# Patient Record
Sex: MALE | Race: BLACK OR AFRICAN AMERICAN | URBAN - METROPOLITAN AREA
[De-identification: names, ages, dates, MRNs, and addresses within clinical notes are randomized per-mention and may not be internally consistent; named-entity substitution may affect disease eponyms.]

---

## 2022-06-15 ENCOUNTER — OFFICE VISIT (OUTPATIENT)
Dept: URGENT CARE | Facility: CLINIC | Age: 42
End: 2022-06-15
Payer: COMMERCIAL

## 2022-06-15 VITALS
DIASTOLIC BLOOD PRESSURE: 94 MMHG | OXYGEN SATURATION: 96 % | HEIGHT: 71 IN | RESPIRATION RATE: 20 BRPM | WEIGHT: 250 LBS | HEART RATE: 75 BPM | BODY MASS INDEX: 35 KG/M2 | TEMPERATURE: 99 F | SYSTOLIC BLOOD PRESSURE: 150 MMHG

## 2022-06-15 DIAGNOSIS — L03.019 PARONYCHIA OF MIDDLE FINGER: Primary | ICD-10-CM

## 2022-06-15 PROCEDURE — 1159F PR MEDICATION LIST DOCUMENTED IN MEDICAL RECORD: ICD-10-PCS | Mod: CPTII,S$GLB,, | Performed by: NURSE PRACTITIONER

## 2022-06-15 PROCEDURE — 1160F RVW MEDS BY RX/DR IN RCRD: CPT | Mod: CPTII,S$GLB,, | Performed by: NURSE PRACTITIONER

## 2022-06-15 PROCEDURE — 3080F PR MOST RECENT DIASTOLIC BLOOD PRESSURE >= 90 MM HG: ICD-10-PCS | Mod: CPTII,S$GLB,, | Performed by: NURSE PRACTITIONER

## 2022-06-15 PROCEDURE — 3080F DIAST BP >= 90 MM HG: CPT | Mod: CPTII,S$GLB,, | Performed by: NURSE PRACTITIONER

## 2022-06-15 PROCEDURE — 10060 INCISION & DRAINAGE: ICD-10-PCS | Mod: S$GLB,,, | Performed by: NURSE PRACTITIONER

## 2022-06-15 PROCEDURE — 3008F BODY MASS INDEX DOCD: CPT | Mod: CPTII,S$GLB,, | Performed by: NURSE PRACTITIONER

## 2022-06-15 PROCEDURE — 3077F PR MOST RECENT SYSTOLIC BLOOD PRESSURE >= 140 MM HG: ICD-10-PCS | Mod: CPTII,S$GLB,, | Performed by: NURSE PRACTITIONER

## 2022-06-15 PROCEDURE — 3008F PR BODY MASS INDEX (BMI) DOCUMENTED: ICD-10-PCS | Mod: CPTII,S$GLB,, | Performed by: NURSE PRACTITIONER

## 2022-06-15 PROCEDURE — 1160F PR REVIEW ALL MEDS BY PRESCRIBER/CLIN PHARMACIST DOCUMENTED: ICD-10-PCS | Mod: CPTII,S$GLB,, | Performed by: NURSE PRACTITIONER

## 2022-06-15 PROCEDURE — 99203 OFFICE O/P NEW LOW 30 MIN: CPT | Mod: 25,S$GLB,, | Performed by: NURSE PRACTITIONER

## 2022-06-15 PROCEDURE — 10060 I&D ABSCESS SIMPLE/SINGLE: CPT | Mod: S$GLB,,, | Performed by: NURSE PRACTITIONER

## 2022-06-15 PROCEDURE — 99203 PR OFFICE/OUTPT VISIT, NEW, LEVL III, 30-44 MIN: ICD-10-PCS | Mod: 25,S$GLB,, | Performed by: NURSE PRACTITIONER

## 2022-06-15 PROCEDURE — 87070 CULTURE OTHR SPECIMN AEROBIC: CPT | Performed by: NURSE PRACTITIONER

## 2022-06-15 PROCEDURE — 1159F MED LIST DOCD IN RCRD: CPT | Mod: CPTII,S$GLB,, | Performed by: NURSE PRACTITIONER

## 2022-06-15 PROCEDURE — 3077F SYST BP >= 140 MM HG: CPT | Mod: CPTII,S$GLB,, | Performed by: NURSE PRACTITIONER

## 2022-06-15 RX ORDER — CIPROFLOXACIN 500 MG/1
500 TABLET ORAL 2 TIMES DAILY
Qty: 14 TABLET | Refills: 0 | Status: SHIPPED | OUTPATIENT
Start: 2022-06-15 | End: 2022-06-22

## 2022-06-15 NOTE — PROCEDURES
Incision & Drainage    Date/Time: 6/15/2022 10:15 AM  Performed by: Lauryn Vergara NP  Authorized by: Lauryn Vergara NP       Type:  Abscess (2 cm in length)  Body area:  Upper extremity  Location details:  Right long finger  Anesthesia:  See MAR for details  Scalpel size: needle.  Complexity:  Simple  Drainage:  Pus (malodorous)  Drainage amount:  Copious  Wound treatment:  Drainage  Packing material:  None  Patient tolerance:  Patient tolerated the procedure well with no immediate complications    Pressure dressing applied.

## 2022-06-15 NOTE — PROGRESS NOTES
"Subjective:       Patient ID: Kali Castillo is a 42 y.o. male.    Vitals:  height is 5' 11" (1.803 m) and weight is 113.4 kg (250 lb). His temperature is 98.8 °F (37.1 °C). His blood pressure is 150/94 (abnormal) and his pulse is 75. His respiration is 20 and oxygen saturation is 96%.     Chief Complaint: Hand Injury    Pt reports that the injury is on the right hand middle finger. Pt reports that he thinks its a ingrown nail. Pt reports lastnight he was removing clothes out the machine and it caught the nail on an angle which caused a lot of pain. Pt finger is swollen.    Provider note begins below  Patient reports hangnail began 4 days ago.  Advance wall up with pus yesterday.      Hand Injury   His dominant hand is their right hand. The incident occurred 12 to 24 hours ago. The incident occurred at home. There was no injury mechanism. The pain is present in the right fingers. The quality of the pain is described as aching and burning. The pain does not radiate. The pain is at a severity of 5/10. The pain is mild. The pain has been fluctuating since the incident. Pertinent negatives include no chest pain, muscle weakness, numbness or tingling. Exacerbated by: touching  He has tried ice (ice ) for the symptoms. The treatment provided mild relief.       Constitution: Negative for sweating, fatigue and fever.   Cardiovascular: Negative for chest pain and sob on exertion.   Gastrointestinal: Negative for nausea, vomiting, constipation and diarrhea.   Skin: Positive for abscess.   Neurological: Negative for numbness.       Objective:      Physical Exam   Constitutional: He is oriented to person, place, and time.   HENT:   Head: Normocephalic and atraumatic.   Cardiovascular: Normal rate.   Pulmonary/Chest: Effort normal. No respiratory distress.   Abdominal: Normal appearance.   Neurological: He is alert and oriented to person, place, and time.   Skin: Skin is dry.   Psychiatric: His behavior is normal. Mood normal. "                  Assessment:       1. Paronychia of middle finger          Plan:       I & D of right middle finger paronychia.    Suspect Pseudomonas.  Cipro.    Pressure dressing for 2 days.  Wash with soap and water.  Return to clinic if symptoms worsen or do not improve.  Wound culture.        Paronychia of middle finger  -     ciprofloxacin HCl (CIPRO) 500 MG tablet; Take 1 tablet (500 mg total) by mouth 2 (two) times daily. for 7 days  Dispense: 14 tablet; Refill: 0  -     Culture, Aerobic

## 2022-06-17 ENCOUNTER — TELEPHONE (OUTPATIENT)
Dept: URGENT CARE | Facility: CLINIC | Age: 42
End: 2022-06-17
Payer: COMMERCIAL

## 2022-06-17 LAB — BACTERIA SPEC AEROBE CULT: NORMAL

## 2022-06-17 NOTE — TELEPHONE ENCOUNTER
Calling to give patient negative wound culture results. Left voicemail for patient to return call to clinic.     Results for orders placed or performed in visit on 06/15/22   Culture, Aerobic    Specimen: Finger, Right Hand; Wound   Result Value Ref Range    Aerobic Bacterial Culture Skin lidya,  no predominant organism

## 2022-06-18 ENCOUNTER — TELEPHONE (OUTPATIENT)
Dept: URGENT CARE | Facility: CLINIC | Age: 42
End: 2022-06-18
Payer: COMMERCIAL

## 2022-06-18 NOTE — TELEPHONE ENCOUNTER
Second attempt to contact patient regarding his lab results from previous visit 06/15/2022.  Paronychia I&D by different provider.  Wound culture negative.  Treated with Cipro for suspected Pseudomonas.  Patient did not answer the phone.  Voicemail was left for him to contact Clinic.  Has not reviewed results via my chart.    Results for orders placed or performed in visit on 06/15/22   Culture, Aerobic    Specimen: Finger, Right Hand; Wound   Result Value Ref Range    Aerobic Bacterial Culture Skin lidya,  no predominant organism

## 2022-06-19 ENCOUNTER — TELEPHONE (OUTPATIENT)
Dept: URGENT CARE | Facility: CLINIC | Age: 42
End: 2022-06-19
Payer: COMMERCIAL

## 2022-06-19 NOTE — TELEPHONE ENCOUNTER
Third attempt to contact patient regarding his lab results from previous visit 06/15/2022.  Paronychia I&D by different provider.  Wound culture negative.  Treated with Cipro for suspected Pseudomonas.  Patient did not answer the phone.  Voicemail was left for him to contact Clinic.  He has reviewed results via my chart.           Results for orders placed or performed in visit on 06/15/22   Culture, Aerobic     Specimen: Finger, Right Hand; Wound   Result Value Ref Range     Aerobic Bacterial Culture Skin lidya,  no predominant organism

## 2023-04-04 ENCOUNTER — PATIENT MESSAGE (OUTPATIENT)
Dept: RESEARCH | Facility: HOSPITAL | Age: 43
End: 2023-04-04
Payer: COMMERCIAL